# Patient Record
Sex: FEMALE | Race: BLACK OR AFRICAN AMERICAN | NOT HISPANIC OR LATINO | Employment: UNEMPLOYED | URBAN - METROPOLITAN AREA
[De-identification: names, ages, dates, MRNs, and addresses within clinical notes are randomized per-mention and may not be internally consistent; named-entity substitution may affect disease eponyms.]

---

## 2022-07-29 ENCOUNTER — SOCIAL WORK (OUTPATIENT)
Dept: ADMINISTRATIVE | Facility: OTHER | Age: 32
End: 2022-07-29

## 2022-07-29 NOTE — PROGRESS NOTES
SW met with pt regarding initial OB assessment. Pt stated this is her 4th pregnancy/1-miscarriage. Pt stated lives alone with her children-13,10,5 and able to perform ADL's independently. Pt stated does not work. Pt stated support system is her sister/Nikia/AUGUST/Ibrahima. [place]. Pt stated unsure of her plan name will call the medicaid office. Pt stated does not have WIC. SW provide pt with information on other community resources.  No other needs identified at this time.    Glenis Bailey,MSW  Pager#[phone]

## 2022-10-13 ENCOUNTER — PATIENT MESSAGE (OUTPATIENT)
Dept: ADMINISTRATIVE | Facility: OTHER | Age: 32
End: 2022-10-13